# Patient Record
Sex: FEMALE | Race: OTHER
[De-identification: names, ages, dates, MRNs, and addresses within clinical notes are randomized per-mention and may not be internally consistent; named-entity substitution may affect disease eponyms.]

---

## 2017-04-10 ENCOUNTER — HOSPITAL ENCOUNTER (EMERGENCY)
Dept: HOSPITAL 62 - ER | Age: 27
Discharge: HOME | End: 2017-04-10
Payer: MEDICAID

## 2017-04-10 VITALS — SYSTOLIC BLOOD PRESSURE: 138 MMHG | DIASTOLIC BLOOD PRESSURE: 70 MMHG

## 2017-04-10 DIAGNOSIS — F17.210: ICD-10-CM

## 2017-04-10 DIAGNOSIS — R10.31: ICD-10-CM

## 2017-04-10 DIAGNOSIS — M79.604: ICD-10-CM

## 2017-04-10 DIAGNOSIS — R10.2: ICD-10-CM

## 2017-04-10 DIAGNOSIS — M54.41: Primary | ICD-10-CM

## 2017-04-10 DIAGNOSIS — R10.9: ICD-10-CM

## 2017-04-10 DIAGNOSIS — M54.5: ICD-10-CM

## 2017-04-10 LAB
APPEARANCE UR: (no result)
BILIRUB UR QL STRIP: NEGATIVE
GLUCOSE UR STRIP-MCNC: NEGATIVE MG/DL
KETONES UR STRIP-MCNC: NEGATIVE MG/DL
NITRITE UR QL STRIP: NEGATIVE
PH UR STRIP: 7 [PH] (ref 5–9)
PROT UR STRIP-MCNC: NEGATIVE MG/DL
SP GR UR STRIP: 1.01
UROBILINOGEN UR-MCNC: NEGATIVE MG/DL (ref ?–2)

## 2017-04-10 PROCEDURE — 81025 URINE PREGNANCY TEST: CPT

## 2017-04-10 PROCEDURE — 96372 THER/PROPH/DIAG INJ SC/IM: CPT

## 2017-04-10 PROCEDURE — 72110 X-RAY EXAM L-2 SPINE 4/>VWS: CPT

## 2017-04-10 PROCEDURE — 99284 EMERGENCY DEPT VISIT MOD MDM: CPT

## 2017-04-10 PROCEDURE — 81001 URINALYSIS AUTO W/SCOPE: CPT

## 2017-04-10 NOTE — ER DOCUMENT REPORT
ED GI/





- General


Chief Complaint: Low Back Pain


Stated Complaint: LOW BACK PAIN


Time seen by provider: 14:13


Mode of Arrival: Ambulatory


Information source: Patient


Notes: 


26-year-old female presents to ED for complaint of lower back pain radiating 

down her right leg with right lower quadrant abdominal pain today.  She states 

she was recently treated for a UTI with 150 mg of Diflucan with no relief from 

symptoms.  She was also told that she had a ruptured right ovarian cyst, and a 

left ovarian cyst.


TRAVEL OUTSIDE OF THE [place] IN LAST 30 DAYS: No





- HPI


Patient complains to provider of: Pelvic pain, Other - Low back


Onset: Last week


Timing/Duration: Intermittent


Quality of pain: Sharp


Severity at maximum: Severe


Severity in ED: Severe


Pain Level: 5


Location: RLQ, Low back, Pelvis


Vaginal bleeding (Compared to normal period): None


LMP: 2017


Associated symptoms: Radiates to back.  denies: Fever, Nausea, Urinary hesitancy

, Urinary frequency, Urinary retention, Urinary urgency, Vaginal discharge


Exacerbated by: Movement


Relieved by: Denies


Similar symptoms previously: Yes


Recently seen / treated by doctor: Yes





- Related Data


Allergies/Adverse Reactions: 


 





No Known Allergies Allergy (Verified 04/10/17 13:35)


 











Past Medical History





- General


Information source: Patient





- Social History


Smoking Status: Current Every Day Smoker


Cigarette use (# per day): Yes - 3 cigarettes a


Chew tobacco use (# tins/day): No


Smoking Education Provided: Yes - less than 2 minutes


Frequency of alcohol use: None


Drug Abuse: None


Occupation: none


Lives with: Spouse/Significant other - With child


Family History: Reviewed & Not Pertinent


Patient has suicidal ideation: No


Patient has homicidal ideation: No





- Past Medical History


Cardiac Medical History: Reports: None


Pulmonary Medical History: Reports: None


EENT Medical History: Reports: None


Neurological Medical History: Reports: None


Endocrine Medical History: Reports: None


Renal/ Medical History: Reports: Hx Ovarian Cysts


Malignancy Medical History: Reports: None


GI Medical History: Reports: None


Musculoskeltal Medical History: Reports None


Skin Medical History: Reports None


Psychiatric Medical History: Reports: None


Traumatic Medical History: Reports: None


Infectious Medical History: Reports: None


Past Surgical History: Reports: Hx  Section





- Immunizations


Immunizations up to date: Yes





Review of Systems





- Review of Systems


Constitutional: No symptoms reported


EENT: No symptoms reported


Cardiovascular: No symptoms reported


Respiratory: No symptoms reported


Gastrointestinal: Abdominal pain - Right flank lower quadrant and pelvic pain 

radiate around to the back


Genitourinary: No symptoms reported


Female Genitourinary: No symptoms reported


Musculoskeletal: Back pain - Low back pain bilateral radiating down right leg, 

Muscle pain


Skin: No symptoms reported


Hematologic/Lymphatic: No symptoms reported


Neurological/Psychological: No symptoms reported


-: Yes All other systems reviewed and negative





Physical Exam





- Vital signs


Vitals: 


 











Temp Pulse Resp BP Pulse Ox


 


 98.3 F   93   16   146/75 H  99 


 


 04/10/17 13:39  04/10/17 13:39  04/10/17 13:39  04/10/17 13:39  04/10/17 13:39











Interpretation: Normal





- General


General appearance: Appears well, Alert





- HEENT


Head: Normocephalic, Atraumatic


Eyes: Normal


Pupils: PERRL





- Respiratory


Respiratory status: No respiratory distress


Chest status: Nontender


Breath sounds: Normal


Chest palpation: Normal





- Cardiovascular


Rhythm: Regular


Heart sounds: Normal auscultation


Murmur: No





- Abdominal


Inspection: Normal


Distension: No distension


Bowel sounds: Normal


Tenderness: Tender - Mild tenderness to right lower quadrant more pain to the 

right pelvic


Organomegaly: No organomegaly





- Back


Back: Normal, Tender.  No: Deformity/step-off, CVA tenderness, Vertebra 

tenderness, Scars, Scoliosis, Wounds, Other





- Extremities


General upper extremity: Normal inspection, Nontender, Normal color, Normal ROM

, Normal temperature


General lower extremity: Normal inspection, Nontender, Normal color, Normal ROM

, Normal temperature, Normal weight bearing.  No: Ashanti's sign





- Neurological


Neuro grossly intact: Yes


Cognition: Normal


Orientation: AAOx4


[place] Coma Scale Eye Opening: Spontaneous


[place] Coma Scale Verbal: Oriented


[place] Coma Scale Motor: Obeys Commands


[place] Coma Scale Total: 15


Speech: Normal


Motor strength normal: LUE, RUE, LLE, RLE


Sensory: Normal





- Psychological


Associated symptoms: Normal affect, Normal mood





- Skin


Skin Temperature: Warm


Skin Moisture: Dry


Skin Color: Normal





Course





- Re-evaluation


Re-evalutation: 





04/10/17 16:24


26-year-old female presented to ED for low back pain radiating down the right 

leg across the buttocks.  The x-rays are negative she also complained of flank 

pain in her urine is negative.  Have discussed use of ibuprofen back exercises 

any hot and cold packs.  We will discharge home with a dispense pack of no code 

and have her follow-up with her primary doctor for any continued pain or 

increase in pain.





- Vital Signs


Vital signs: 


 











Temp Pulse Resp BP Pulse Ox


 


 98.3 F   87   18   138/70 H  100 


 


 04/10/17 16:55  04/10/17 16:55  04/10/17 16:55  04/10/17 16:55  04/10/17 16:55














- Diagnostic Test


Radiology reviewed: Image reviewed, Reports reviewed





Discharge





- Discharge


Clinical Impression: 


 Flank pain





Back pain


Qualifiers:


 Back pain location: low back pain Chronicity: unspecified Back pain laterality

: bilateral Sciatica presence: with sciatica Sciatica laterality: sciatica of 

right side Qualified Code(s): M54.41 - Lumbago with sciatica, right side





Condition: Stable


Disposition: HOME, SELF-CARE


Instructions:  Stretching Exercises for the Back ([facility]), Family Physicians / 

Practices


Additional Instructions: 


LOW BACK PAIN:





     Three out of every four people will have an episode of disabling back pain 

during their lifetime.  Most commonly the pain is due to straining of the 

muscles and ligaments in the low back.


     Usual treatment includes: 


(1) Rest on a firm surface.  Avoid lying on your stomach.  


(2) Ice pack the painful area.  After a few days, gentle heat may be used 

intermittently to relax the area, or ice packs can be continued.  


(3) Medication may be needed -- muscle relaxers and antiinflammatory medicines 

are commonly used.  


(4) As the back improves, exercises are prescribed to strengthen the back and 

abdominal muscles.


     Your doctor will advise you on the proper care for your back at each stage 

in your recovery.  You may be better in a few days -- or healing may take 

several weeks.


     If new symptoms of a "herniated disc" (radiation of pain, numbness, or 

tingling down the back of the leg or weakness in the leg) occur, you should be 

re-examined.  Further testing may be necessary.





Flank Pain





     We weren't able to prove an exact cause for your flank pain. Pain in the 

flank can be caused by a muscle strain or spasm. Sometimes a kidney stone 

causes pain, but can't be found on our tests. Infection in the kidney should be 

evident on a urine test. Early shingles can occasionally cause flank pain, 

without the rash that proves the diagnosis. On rare occasions, disease of the 

pancreas, aorta, spleen, or colon can create pain in the flank.


     At this time, there's no evidence of a dangerous condition, and it seems 

safe for you to be at home. If the pain goes away and does not come back, no 

further testing will be needed. If pain persists, or becomes more severe, we 

may need to repeat some tests or order additional new testing.


     Blood in the urine, urgency to urinate frequently, and pain that radiates 

to the groin can indicate a kidney stone. Fever may mean that the pain is due 

to infection, either of the kidney or the colon (diverticulitis). If your pain 

is early shingles, you should develop an eruption of blisters in the painful 

area within a few days. 


     Call the doctor or return if you have pain that is spreading or becoming 

more severe, pain that does not resolve with time, fever, or any other new 

symptoms.








ORAL NARCOTIC MEDICATION:


     You have been given a dispense pack for pain control.  This medication is 

a narcotic.  It's best taken with food, as nausea can result if taken on an 

empty stomach.


     Don't operate machinery or drive within six hours of taking this 

medication.  Do not combine this medicine with alcohol, or with any medication 

which can cause sedation (such as cold tablets or sleeping pills) unless you 

get permission from the physician.


     Narcotics tend to cause constipation.  If possible, drink plenty of fluids 

and eat a diet high in fiber and fruits.





     Please be aware that prescription narcotics also have the potential for 

abuse.  People become addicted to these medications because of the general 

sense of wellbeing that they induce.  This feeling along with a significant 

reduction in tension, anxiety, and aggression provides a stimulating seductive 

quality to these drugs.  Once your pain is under control, we encourage you to 

discard your unused narcotics.





Ibuprofen





     Ibuprofen is an excellent, safe drug for pain control.  In addition, it 

has potent antiinflammatory effects which are beneficial, especially in the 

treatment of injuries, arthritis, or tendonitis. It's best to take ibuprofen 

with food.  Persons with ulcer disease or allergy to aspirin should notify 

their physician of this before taking ibuprofen.


     Take the medication exactly as prescribed.  Don't take additional doses 

unless instructed to do so by your doctor.  If you develop wheezing, shortness 

of breath, hives, faintness, stomach pain, vomiting, or dark black stools, 

return for re-evaluation at once.





ICE PACKS:


     Apply ice packs frequently against the painful area.  Many different 

schedules are recommended, such as "20 minutes on, 20 minutes off" or "one hour 

ice, two hours rest."  If you need to work, you may need to go longer between 

ice treatments.  You should plan to have the area ice packed AT LEAST one 

fourth of the time.


     The ice should be applied over the wrap, tape, or splint, or over a layer 

of cloth -- not directly against the skin.  Some ice bags have a built-in cloth 

and can be put directly on the skin.





WARM PACKS:


     After approximately two days, apply gentle heat (such as a heating pad or 

hot water bottle) for about 20 to 30 minutes about every two hours -- at least 

four times daily.  Warmth and elevation will help you make a more rapid recovery

, and will ease the pain considerably.


     Do not use HOT heat, and never apply heat for longer than 30 minutes.  The 

continuous heat can invisibly damage skin and muscles -- even when no burn is 

seen on the surface.  Damaged muscles can make you MORE sore.








FOLLOW-UP CARE:


If you have been referred to a physician for follow-up care, call the physician

s office for an appointment as you were instructed or within the next two days.

  If you experience worsening or a significant change in your symptoms, notify 

the physician immediately or return to the Emergency Department at any time for 

re-evaluation.


Forms:  Elevated Blood Pressure, Smoking Cessation Education

## 2018-01-10 ENCOUNTER — HOSPITAL ENCOUNTER (EMERGENCY)
Dept: HOSPITAL 62 - ER | Age: 28
Discharge: HOME | End: 2018-01-10
Payer: MEDICAID

## 2018-01-10 VITALS — SYSTOLIC BLOOD PRESSURE: 126 MMHG | DIASTOLIC BLOOD PRESSURE: 74 MMHG

## 2018-01-10 DIAGNOSIS — R05: ICD-10-CM

## 2018-01-10 DIAGNOSIS — R50.9: ICD-10-CM

## 2018-01-10 DIAGNOSIS — M79.1: Primary | ICD-10-CM

## 2018-01-10 DIAGNOSIS — F17.200: ICD-10-CM

## 2018-01-10 PROCEDURE — 99283 EMERGENCY DEPT VISIT LOW MDM: CPT

## 2018-01-12 ENCOUNTER — HOSPITAL ENCOUNTER (EMERGENCY)
Dept: HOSPITAL 62 - ER | Age: 28
Discharge: HOME | End: 2018-01-12
Payer: MEDICAID

## 2018-01-12 VITALS — DIASTOLIC BLOOD PRESSURE: 71 MMHG | SYSTOLIC BLOOD PRESSURE: 113 MMHG

## 2018-01-12 DIAGNOSIS — R09.89: ICD-10-CM

## 2018-01-12 DIAGNOSIS — R09.81: Primary | ICD-10-CM

## 2018-01-12 DIAGNOSIS — F17.210: ICD-10-CM

## 2018-01-12 LAB
A TYPE INFLUENZA AG: NEGATIVE
B INFLUENZA AG: NEGATIVE

## 2018-01-12 PROCEDURE — 87804 INFLUENZA ASSAY W/OPTIC: CPT

## 2018-01-12 PROCEDURE — 99283 EMERGENCY DEPT VISIT LOW MDM: CPT

## 2018-01-12 PROCEDURE — 71046 X-RAY EXAM CHEST 2 VIEWS: CPT

## 2018-01-12 NOTE — ER DOCUMENT REPORT
ED Flu Like





- General


Chief Complaint: Congestion


Stated Complaint: CONGESTION


Time Seen by Provider: 18 09:18


Mode of Arrival: Ambulatory


Information source: Patient


Notes: 





27-year-old female presented to ED for cough and cold flu symptoms for a week.  

She states 2 days ago she was told she had the flu but she was not tested.  She 

states she has had a lot of congestion and nasal drainage.  She states she felt 

like she had the temperature but has not taken her temperature.


TRAVEL OUTSIDE OF THE [place] IN LAST 30 DAYS: No





- HPI


Onset: Last week


Timing/Duration: Intermittent


Quality of pain: Achy


Severity: Moderate


Pain Level: 3


CO exposure: No


Associated symptoms: Body/muscle aches, Chills, Nonproductive cough, Fever, 

Rhinnorhea, Sinus pain/drainage, Sore throat


Similar symptoms previously: Yes


Recently seen / treated by doctor: Yes





- Related Data


Allergies/Adverse Reactions: 


 





No Known Allergies Allergy (Verified 18 08:45)


 











Past Medical History





- General


Information source: Patient





- Social History


Smoking Status: Current Every Day Smoker


Cigarette use (# per day): Yes - 2 cigarettes a day


Chew tobacco use (# tins/day): No


Smoking Education Provided: Yes - 4 minutes


Frequency of alcohol use: None


Drug Abuse: None


Occupation: [employer]


Lives with: Family


Family History: Arthritis, CAD, DM, Hyperlipidemia, Hypertension.  denies: COPD

, CVA, Malignancy, Thyroid Disfunction


Patient has suicidal ideation: No


Patient has homicidal ideation: No





- Past Medical History


Cardiac Medical History: Reports: None


Pulmonary Medical History: Reports: None


EENT Medical History: Reports: None


Neurological Medical History: Reports: None


Endocrine Medical History: Reports: None


Renal/ Medical History: Reports: Hx Ovarian Cysts


Malignancy Medical History: Reports: None


GI Medical History: Reports: None


Musculoskeltal Medical History: Reports None


Skin Medical History: Reports None


Psychiatric Medical History: Reports: None


Traumatic Medical History: Reports: None


Infectious Medical History: Reports: None


Past Surgical History: Reports: Hx  Section





- Immunizations


Immunizations up to date: Yes


Hx Diphtheria, Pertussis, Tetanus Vaccination: No





Review of Systems





- Review of Systems


Constitutional: Fever, Recent illness


EENT: Nose congestion, Nose discharge, Sinus pressure, Sinus discharge, Throat 

pain


Cardiovascular: No symptoms reported


Respiratory: Cough


Gastrointestinal: No symptoms reported


Genitourinary: No symptoms reported


Female Genitourinary: No symptoms reported


Musculoskeletal: No symptoms reported


Skin: No symptoms reported


Hematologic/Lymphatic: No symptoms reported


Neurological/Psychological: No symptoms reported


-: Yes All other systems reviewed and negative





Physical Exam





- Vital signs


Vitals: 


 











Temp Pulse Resp BP Pulse Ox


 


 98.4 F   97   16   119/76   98 


 


 18 08:56  18 08:56  18 08:56  18 08:56  18 08:56











Interpretation: Normal





- General


General appearance: Appears well, Alert





- HEENT


Head: Normocephalic, Atraumatic


Eyes: Normal


Pupils: PERRL


Ears: Normal


External canal: Normal


Tympanic membrane: Normal


Sinus: Normal


Nasal: Purulent discharge, Swelling


Mouth/Lips: Normal


Mucous membranes: Normal


Pharynx: Post nasal drainage


Neck: Normal





- Respiratory


Respiratory status: No respiratory distress


Chest status: Nontender


Breath sounds: Nonproductive cough


Chest palpation: Normal





- Cardiovascular


Rhythm: Regular


Heart sounds: Normal auscultation


Murmur: No





- Abdominal


Inspection: Normal


Distension: No distension


Bowel sounds: Normal


Tenderness: Nontender


Organomegaly: No organomegaly





- Back


Back: Normal, Nontender





- Extremities


General upper extremity: Normal inspection, Nontender, Normal color, Normal ROM

, Normal temperature


General lower extremity: Normal inspection, Nontender, Normal color, Normal ROM

, Normal temperature, Normal weight bearing.  No: Ashanti's sign





- Neurological


Neuro grossly intact: Yes


Cognition: Normal


Orientation: AAOx4


[place] Coma Scale Eye Opening: Spontaneous


[place] Coma Scale Verbal: Oriented


[place] Coma Scale Motor: Obeys Commands


[place] Coma Scale Total: 15


Speech: Normal


Motor strength normal: LUE, RUE, LLE, RLE


Sensory: Normal





- Psychological


Associated symptoms: Normal affect, Normal mood





- Skin


Skin Temperature: Warm


Skin Moisture: Dry


Skin Color: Normal





Course





- Re-evaluation


Re-evalutation: 





18 11:11


Assessment consistent with an upper respiratory infection.  Chest x-ray and flu 

swabs are negative.  Patient was discharged home with instructions for an upper 

respiratory infection.





- Vital Signs


Vital signs: 


 











Temp Pulse Resp BP Pulse Ox


 


 98.6 F   66   16   113/71   98 


 


 18 11:26  18 11:26  18 11:26  18 11:26  18 11:26














Discharge





- Discharge


Clinical Impression: 


 Flu-like symptoms





Condition: Stable


Disposition: HOME, SELF-CARE


Instructions:  Family Physicians / Practices


Additional Instructions: 


UPPER RESPIRATORY ILLNESS:





     You have a viral infection of the respiratory passages -- a "cold."  This 

common infection causes nasal congestion, drainage, and often sore throat and 

cough.  It is highly contagious.  The disease usually lasts about 10 to 14 days.


     There is no "cure" for the viral infection -- it must run its course.  If 

there is a complication, such as bacterial infection in the nose, sinuses, 

middle ear, or bronchial tubes, antibiotics may be required.  The antibiotics 

won't affect the virus.


     Drink plenty of fluids.  A humidifier may help.  An expectorant medication 

or decongestant may make you more comfortable.  Use acetaminophen or ibuprofen 

for fever or aches.


     See the doctor if fever persists over two days, if there is any 

significant worsening of your symptoms, or if you simply fail to improve as 

expected.








DECONGESTANT MEDICATION:


     A decongestant medicine has been prescribed.  Often this medicine is 

combined in the same tablet with an antihistamine or expectorant. This type of 

medicine is helpful in treating a bad cold or sinus condition, as well as in 

treatment of the nasal congestion of hay fever.  It is not of much benefit for 

lung infections.


     Decongestant medicines are related to stimulants.  They can cause an 

increase in blood pressure and heart rate.  Persons with heart disease and high 

blood pressure should not take decongestants without discussing this with the 

physician.


     If you develop palpitations, chest pain, headache, or tremors, stop the 

medicine and consult your physician.





Viral Syndrome





     The physician has diagnosed a viral infection.  Viruses not only cause 

"colds," but can cause many different symptoms including generalized aching, 

fever, headache, cough, diarrhea, nausea, vomiting, and fatigue.


     The treatment, for the most part, is simply relief of symptoms. This means 

that antibiotics are usually not given.  Rest, fluids, pain medications and, 

occasionally, medication for the specific symptoms that are most bothersome 

will be prescribed. Use good handwashing to avoid passing the virus to others. 

Shared toys should be cleaned with disinfectant. Clean the toilets, sinks, and 

counter surfaces in bathrooms. Launder clothing in hot water.


     Contact the physician if you develop any new or unusual symptoms such as 

severe headache, stiff neck, high fever, chest pain, productive cough, or 

shortness of breath.  You should be rechecked if you don't see marked 

improvement within seven to 10 days.





COUGH-SUPPRESSANT & EXPECTORANT MEDICATION:


     You are to use a cough medication as needed for relief of symptoms.  This 

medicine is a combination of an expectorant (to make the mucous thinner and 

more easily "coughed up") and a cough suppressant (to reduce the frequency of 

coughing).


     The cough-suppressant medicine is related to narcotics.  You may 

experience mild nausea and sleepiness.  Some patients who are very sensitive to 

narcotics may have stomach pain from this medicine. Taking the medicine with 

food reduces these side effects.  Do not drive or work with machinery until you 

know how this medicine affects you.


     The expectorant should have no side effects.  Iodine-containing 

expectorants (such as organidin) should not be taken by persons with active 

thyroid disease unless approved by your doctor.


     Call the doctor if you develop shortness of breath, hives, rash, itching, 

lightheadedness, or severe nausea and vomiting.








USE OF ACETAMINOPHEN (Tylenol):


     Acetaminophen may be taken for pain relief or fever control. It's much 

safer than aspirin, offering a wider range of "safe" dosages.  It is safe 

during pregnancy.  Some brand names are Tylenol, Panadol, Datril, Anacin 3, 

Tempra, and Liquiprin. Acetaminophen can be repeated every four hours.  The 

following are maximum recommended dosages:


>89 pounds or adults          650 mg to 900 mg


Acetaminophen can be repeated every four hours.  Maximum dose not to exceed 

4000 mg a day.








SMOKING:


     If you smoke, you should stop smoking.  The tar and chemicals in cigarette 

smoke are harmful.  Smoking has been shown to cause:


          emphysema


          chronic bronchitis


          lung cancer


          mouth and throat cancer


          stomach and pancreas cancer


          premature aging


          birth defects


     In addition, smoking increases ear and lung infections in children of 

smokers.








FOLLOW-UP CARE:


If you have been referred to a physician for follow-up care, call the physician

s office for an appointment as you were instructed or within the next two days.

  If you experience worsening or a significant change in your symptoms, notify 

the physician immediately or return to the Emergency Department at any time for 

re-evaluation.





Prescriptions: 


Ondansetron [Zofran Odt 4 mg Tablet] 1 tab PO Q6H #15 tab.rapdis


Forms:  Smoking Cessation Education, Return to Work

## 2018-01-12 NOTE — ER DOCUMENT REPORT
ED Pediatric Illness





- General


Chief Complaint: Congestion


Stated Complaint: CONGESTION


Time Seen by Provider: 18 09:18


Mode of Arrival: Ambulatory


Information source: Patient


TRAVEL OUTSIDE OF THE [place] IN LAST 30 DAYS: No





- Related Data


Allergies/Adverse Reactions: 


 





No Known Allergies Allergy (Verified 18 08:45)


 











Past Medical History





- Social History


Smoking Status: Never Smoker


Chew tobacco use (# tins/day): No


Frequency of alcohol use: None


Drug Abuse: None


Family History: Reviewed & Not Pertinent


Patient has suicidal ideation: No


Patient has homicidal ideation: No


Renal/ Medical History: Reports: Hx Ovarian Cysts.  Denies: Hx Peritoneal 

Dialysis


Past Surgical History: Reports: Hx  Section





- Immunizations


Immunizations up to date: Yes


Hx Diphtheria, Pertussis, Tetanus Vaccination: No





Physical Exam





- Vital signs


Vitals: 





 











Temp Pulse Resp BP Pulse Ox


 


 98.4 F   97   16   119/76   98 


 


 18 08:56  18 08:56  18 08:56  18 08:56  18 08:56














Course





- Vital Signs


Vital signs: 





 











Temp Pulse Resp BP Pulse Ox


 


 98.4 F   97   16   119/76   98 


 


 18 08:56  18 08:56  18 08:56  18 08:56  18 08:56

## 2018-01-12 NOTE — RADIOLOGY REPORT (SQ)
EXAM DESCRIPTION:  CHEST PA/LAT



COMPLETED DATE/TIME:  1/12/2018 9:58 am



REASON FOR STUDY:  cough fever



COMPARISON:  None.



EXAM PARAMETERS:  NUMBER OF VIEWS: two views

TECHNIQUE: Digital Frontal and Lateral radiographic views of the chest acquired.

RADIATION DOSE: NA

LIMITATIONS: none



FINDINGS:  LUNGS AND PLEURA: No opacities, masses or pneumothorax. No pleural effusion.

MEDIASTINUM AND HILAR STRUCTURES: No masses or contour abnormalities.

HEART AND VASCULAR STRUCTURES: Heart normal size.  No evidence for failure.

BONES: No acute findings.

HARDWARE: None in the chest.

OTHER: No other significant finding.



IMPRESSION:  NO SIGNIFICANT RADIOGRAPHIC FINDING IN THE CHEST.



TECHNICAL DOCUMENTATION:  JOB ID:  [id number]

 2011 [employer]- All Rights Reserved

## 2018-01-22 ENCOUNTER — HOSPITAL ENCOUNTER (EMERGENCY)
Dept: HOSPITAL 62 - ER | Age: 28
LOS: 1 days | Discharge: HOME | End: 2018-01-23
Payer: MEDICAID

## 2018-01-22 DIAGNOSIS — R11.0: ICD-10-CM

## 2018-01-22 DIAGNOSIS — T18.4XXA: ICD-10-CM

## 2018-01-22 DIAGNOSIS — M54.5: ICD-10-CM
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
## 2018-01-22 DIAGNOSIS — N93.9: ICD-10-CM

## 2018-01-22 DIAGNOSIS — R10.31: ICD-10-CM

## 2018-01-22 DIAGNOSIS — R03.0: ICD-10-CM

## 2018-01-22 DIAGNOSIS — N83.201: Primary | ICD-10-CM

## 2018-01-22 LAB
ADD MANUAL DIFF: NO
ALBUMIN SERPL-MCNC: 4.6 G/DL (ref 3.5–5)
ALP SERPL-CCNC: 57 U/L (ref 38–126)
ALT SERPL-CCNC: 30 U/L (ref 9–52)
ANION GAP SERPL CALC-SCNC: 10 MMOL/L (ref 5–19)
APPEARANCE UR: (no result)
APTT PPP: YELLOW S
AST SERPL-CCNC: 25 U/L (ref 14–36)
BASOPHILS # BLD AUTO: 0.1 10^3/UL (ref 0–0.2)
BASOPHILS NFR BLD AUTO: 0.8 % (ref 0–2)
BILIRUB DIRECT SERPL-MCNC: 0.1 MG/DL (ref 0–0.4)
BILIRUB SERPL-MCNC: 0.5 MG/DL (ref 0.2–1.3)
BILIRUB UR QL STRIP: NEGATIVE
BUN SERPL-MCNC: 12 MG/DL (ref 7–20)
CALCIUM: 10.2 MG/DL (ref 8.4–10.2)
CHLORIDE SERPL-SCNC: 106 MMOL/L (ref 98–107)
CO2 SERPL-SCNC: 23 MMOL/L (ref 22–30)
EOSINOPHIL # BLD AUTO: 0.1 10^3/UL (ref 0–0.6)
EOSINOPHIL NFR BLD AUTO: 1 % (ref 0–6)
ERYTHROCYTE [DISTWIDTH] IN BLOOD BY AUTOMATED COUNT: 14.1 % (ref 11.5–14)
GLUCOSE SERPL-MCNC: 77 MG/DL (ref 75–110)
GLUCOSE UR STRIP-MCNC: NEGATIVE MG/DL
HCT VFR BLD CALC: 42 % (ref 36–47)
HGB BLD-MCNC: 14.4 G/DL (ref 12–15.5)
KETONES UR STRIP-MCNC: NEGATIVE MG/DL
LYMPHOCYTES # BLD AUTO: 1.8 10^3/UL (ref 0.5–4.7)
LYMPHOCYTES NFR BLD AUTO: 22.1 % (ref 13–45)
MCH RBC QN AUTO: 30.1 PG (ref 27–33.4)
MCHC RBC AUTO-ENTMCNC: 34.2 G/DL (ref 32–36)
MCV RBC AUTO: 88 FL (ref 80–97)
MONOCYTES # BLD AUTO: 0.5 10^3/UL (ref 0.1–1.4)
MONOCYTES NFR BLD AUTO: 5.5 % (ref 3–13)
NEUTROPHILS # BLD AUTO: 5.9 10^3/UL (ref 1.7–8.2)
NEUTS SEG NFR BLD AUTO: 70.6 % (ref 42–78)
NITRITE UR QL STRIP: NEGATIVE
PH UR STRIP: 5 [PH] (ref 5–9)
PLATELET # BLD: 560 10^3/UL (ref 150–450)
POTASSIUM SERPL-SCNC: 4.6 MMOL/L (ref 3.6–5)
PROT SERPL-MCNC: 7.3 G/DL (ref 6.3–8.2)
PROT UR STRIP-MCNC: NEGATIVE MG/DL
RBC # BLD AUTO: 4.78 10^6/UL (ref 3.72–5.28)
SODIUM SERPL-SCNC: 139.4 MMOL/L (ref 137–145)
SP GR UR STRIP: 1.02
TOTAL CELLS COUNTED % (AUTO): 100 %
UROBILINOGEN UR-MCNC: NEGATIVE MG/DL (ref ?–2)
WBC # BLD AUTO: 8.3 10^3/UL (ref 4–10.5)

## 2018-01-22 PROCEDURE — 81001 URINALYSIS AUTO W/SCOPE: CPT

## 2018-01-22 PROCEDURE — 80053 COMPREHEN METABOLIC PANEL: CPT

## 2018-01-22 PROCEDURE — 36415 COLL VENOUS BLD VENIPUNCTURE: CPT

## 2018-01-22 PROCEDURE — 84702 CHORIONIC GONADOTROPIN TEST: CPT

## 2018-01-22 PROCEDURE — 87591 N.GONORRHOEAE DNA AMP PROB: CPT

## 2018-01-22 PROCEDURE — 87086 URINE CULTURE/COLONY COUNT: CPT

## 2018-01-22 PROCEDURE — 74177 CT ABD & PELVIS W/CONTRAST: CPT

## 2018-01-22 PROCEDURE — 99284 EMERGENCY DEPT VISIT MOD MDM: CPT

## 2018-01-22 PROCEDURE — 87210 SMEAR WET MOUNT SALINE/INK: CPT

## 2018-01-22 PROCEDURE — 96375 TX/PRO/DX INJ NEW DRUG ADDON: CPT

## 2018-01-22 PROCEDURE — 85025 COMPLETE CBC W/AUTO DIFF WBC: CPT

## 2018-01-22 PROCEDURE — 87491 CHLMYD TRACH DNA AMP PROBE: CPT

## 2018-01-22 PROCEDURE — 96365 THER/PROPH/DIAG IV INF INIT: CPT

## 2018-01-22 NOTE — ER DOCUMENT REPORT
ED Medical Screen (RME)





- General


Chief Complaint: Abdominal Pain


Stated Complaint: BACK/ABDOMINAL PAIN


Time Seen by Provider: 18 18:10


TRAVEL OUTSIDE OF THE [place] IN LAST 30 DAYS: No





- HPI


Notes: 





18 18:11


Patient is a 27-year-old female with no significant past medical history 

presents ED complaining of right lower quadrant abdominal pain 5 days.  

Patient states that the pain does not radiate and has been relatively steady 

over the last 5 days with intermittent periods of increased pain.  Patient 

states that on occasion she will have some pain in the left lower quadrant, but 

is primarily to the right.  Patient has not noticed any worsening pain with 

walking or jumping.  Patient has had intermittent nausea without any vomiting.  

She still eating and drinking without any difficulties.  She is urinating 

normally and having normal bowel movements.  Patient states that she also does 

have associated right lower back pain that does not radiate around to her 

groin.   patient is not aware of any injury or recent trauma.  She denies any 

injections to her lower back or IV drug use.  Pt has also had white vaginal 

discharge x months.  Patient denies any drug allergies.  Denies any headache, 

fever, neck pain, URI, sore throat, chest pain, palpitations, syncope, cough, 

shortness of breath, wheeze, dyspnea, vomiting/diarrhea, urinary retention, 

dysuria, hematuria, loss of control of bowel or bladder, numbness/tingling, 

saddle anesthesia, muscle paralysis/weakness, or rash.








Pt was eval'd today by her PCM who sent her to the ED for evaluation.  Pt has 

remained NPO since breakfast this morning.








I have treated and performed a rapid initial assessment of this patient.  A 

comprehensive ED assessment and evaluation of the patient, analysis of test 

results and completion of medical decision making process will be conducted by 

additional ED providers.


18 18:16








- Related Data


Allergies/Adverse Reactions: 


 





No Known Allergies Allergy (Verified 18 17:22)


 











Past Medical History


Renal/ Medical History: Reports: Hx Ovarian Cysts.  Denies: Hx Peritoneal 

Dialysis


Past Surgical History: Reports: Hx  Section





- Immunizations


Immunizations up to date: Yes


Hx Diphtheria, Pertussis, Tetanus Vaccination: No





Physical Exam





- Vital signs


Vitals: 





 











Temp Pulse Resp BP Pulse Ox


 


 98.7 F   90   18   124/71   100 


 


 18 17:26  18 17:26  18 17:26  18 17:26  18 17:26














- Respiratory


Respiratory status: No respiratory distress


Breath sounds: Normal





- Cardiovascular


Rhythm: Regular


Heart sounds: Normal auscultation





- Abdominal


Inspection: Normal


Distension: No distension


Bowel sounds: Normal


Tenderness: Tender - RLQ





Course





- Vital Signs


Vital signs: 





 











Temp Pulse Resp BP Pulse Ox


 


 98.7 F   90   18   124/71   100 


 


 18 17:26  18 17:26  18 17:26  18 17:26  18 17:26

## 2018-01-23 VITALS — DIASTOLIC BLOOD PRESSURE: 75 MMHG | SYSTOLIC BLOOD PRESSURE: 117 MMHG

## 2018-01-23 LAB
BACTERIA (WET MOUNT): (no result)
CHLAM PCR: NOT DETECTED
EPITHELIALS (WET MOUNT): (no result)
GON PCR: NOT DETECTED
RBCS (WET MOUNT): (no result)
T.VAGINALIS (WET MOUNT): (no result)
WBCS (WET MOUNT): (no result)
YEAST (WET MOUNT): (no result)

## 2018-01-23 NOTE — RADIOLOGY REPORT (SQ)
EXAM DESCRIPTION:  CT ABDOMEN AND PELVIS WITH CONTRAST



CLINICAL HISTORY: right lower quad pain



COMPARISON: None Available.



TECHNIQUE: CT of the abdomen and pelvis are performed during IV

bolus administration of 77.30 mL of Isovue-370. Delayed images

obtained.



DLP: 785.16 mGycm



FINDINGS: 



Abdomen:

The liver has normal size and density. No intrahepatic mass or

biliary dilatation. No calcified gallstones.  The spleen,

pancreas, and adrenal glands are unremarkable.  The kidneys have

normal size and contour without evidence of solid mass or

hydronephrosis.  The aorta and IVC have normal caliber and

position.  The portal vein patent. The proximal visceral and

renal arteries are patent.  No free intraperitoneal air.  The

stomach and duodenum have normal course.



Pelvis: IUD in the uterus with appropriate orientation. Urinary

bladder is unremarkable.  Tiny amount of free pelvic fluid is

likely physiologic.  No dilated loops of large or small bowel. 

Possible metallic foreign body in the distal tip of the appendix

measuring approximately 0.9 x 0.2 cm. No dilation of the appendix

or periappendiceal inflammatory change.



The visualized  lung bases are clear.



No destructive bone lesions identified.





IMPRESSION: 



1. No CT evidence of acute appendicitis.



2. Possible metallic foreign body in the distal portion of the

appendix measuring 0.9 x 0.2 cm. Correlation for history of

ingested metallic foreign body recommended.



3. Tiny amount of free pelvic fluid is likely physiologic.



This exam was performed according to our departmental

dose-optimization program, which includes automated exposure

control, adjustment of the mA and/or kV according to patient size

and/or use of iterative reconstruction technique.

## 2018-01-23 NOTE — ER DOCUMENT REPORT
ED GI/





- General


Chief Complaint: Abdominal Pain


Stated Complaint: BACK/ABDOMINAL PAIN


Time Seen by Provider: 18 18:10


Mode of Arrival: Ambulatory


Information source: Patient


TRAVEL OUTSIDE OF THE [place] IN LAST 30 DAYS: No





- HPI


Patient complains to provider of: Abdominal pain, Vaginal discharge


Onset: Last week


Notes: 





18 00:08


Patient arrives with complaints of right-sided abdominal pain and low back pain 

that started approximately 5 days ago.  Pain has been constant.  It seems to be 

getting somewhat worse.  Nothing in particular seems to make the pain better or 

worse.  The patient's had nausea but denies any vomiting or diarrhea.  She 

denies any dysuria or hematuria.  She is complaining of some vaginal discharge.

  She states her last normal menstrual period was December 15.  She denies any 

flank pain.  She denies any chest pain or shortness of breath.  No fevers.  No 

rash.  No headache, blurred vision, numbness tingling or weakness.  She had a 

prior  in the past, she denies any other abdominal surgeries.  She 

denies any headache, blurred vision, numbness, tingling, weakness.  She denies 

any other complaints at this time.





- Related Data


Allergies/Adverse Reactions: 


 





No Known Allergies Allergy (Verified 18 17:22)


 











Past Medical History





- Social History


Smoking Status: Unknown if Ever Smoked


Family History: Arthritis, CAD, DM, Hyperlipidemia, Hypertension.  denies: COPD

, CVA, Malignancy, Thyroid Disfunction


Renal/ Medical History: Reports: Hx Ovarian Cysts.  Denies: Hx Peritoneal 

Dialysis


Past Surgical History: Reports: Hx  Section





- Immunizations


Immunizations up to date: Yes


Hx Diphtheria, Pertussis, Tetanus Vaccination: No





Review of Systems





- Review of Systems


-: Yes All other systems reviewed and negative





Physical Exam





- Vital signs


Vitals: 


 











Temp Pulse Resp BP Pulse Ox


 


 98.7 F   90   18   124/71   100 


 


 18 17:26  18 17:26  18 17:26  18 17:26  18 17:26














- Notes


Notes: 





GENERAL: alert, cooperative, nontoxic, no distress.


HEAD: normocephalic, atraumatic


EYES: conjunctiva pink without discharge, no external redness or swelling.


EARS: no external swelling, no external redness


NOSE: atraumatic, no external swelling


MOUTH/THROAT: mucous membranes moist and pink, posterior pharynx without 

erythema, swelling, exudate. No trismus or drooling.


NECK: soft, supple, full range of motion, no meningismus.


CHEST: no distress, lungs clear and equal throughout.  No wheezing, rales, 

rhonchi.


CARDIAC: regular rate and rhythm, no murmur, normal capillary refill, normal 

pulses.  No peripheral edema noted.


ABDOMEN: Soft, mild tenderness palpation of the lower abdomen.  No rebound 

tenderness or guarding.


BACK: full range of motion, no CVA tenderness.


EXTREMITIES: full range of motion of all extremities.  No redness, no swelling.


NEURO: alert and oriented x 3, no focal deficits, full range of motion of all 

extremities.


PYSCH: appropriate mood, affect.  Patient is cooperative.


SKIN: pink, warm, dry, no rash.


: Performed with female nurse at the bedside.  No external lesions noted.  

Cervix is closed.  Small amount of white vaginal discharge is present.  No 

cervical motion tenderness.  Minimal right and left adnexal tenderness on 

bimanual exam with no mass.





Course





- Re-evaluation


Re-evalutation: 





18 01:56


Patient is nontoxic appearing with stable vitals.  The patient arrives with 5 

days of right-sided abdominal pain radiating into her back.  She is a benign 

abdominal exam with minimal tenderness to palpation no guarding or rebound 

tenderness.  She has had no fever.  She is afebrile here.  Her white blood cell 

count is normal.  Pelvic exam shows some mild adnexal tenderness bilaterally 

with no purulent drainage from the cervix and no cervical motion tenderness.  

Patient was treated for gonorrhea and chlamydia here in the emergency 

department.  Those cultures are still pending.  Wet prep shows possible actual 

vaginosis which I will treat her with Flagyl for.  CT with IV contrast of the 

abdomen and pelvis shows metallic foreign body within the appendix.  There is 

no dilation of the appendix or inflammation around the appendix concerning for 

acute appendicitis.  I discussed this with the surgeon on call Dr. Ambrosio, he 

was given all the information believes that it is fine for this patient to 

follow-up as an outpatient in the office.  Patient has some free fluid within 

the pelvis likely secondary to a recently ruptured ovarian cyst.  The patient 

has a history of this in the past and states that this pain does feel similar 

to that as well.  Patient was given all this information.  She will be 

discharged home with a prescription for Ultram for pain.  Instructions to follow

-up with surgeon as an outpatient.  Follow-up sooner for increasing pain, fever

, persistent vomiting, or for any further concerns.





The patient's emergency department workup and current diagnosis were explained 

to the patient and or family.  Follow-up instructions were provided.  

Medications if prescribed were discussed. Instructions for when to return to 

the emergency department including specific  worrisome symptoms were discussed 

with the patient and/or family.





The patient is noted to have elevated blood pressure during today's emergency 

department visit.  The patient was informed of this finding.  The patient was 

instructed that this may be related to pre-hypertension and requires further 

evaluation with a primary care provider.  The patient has no hypertensive 

symptoms at this time.





- Vital Signs


Vital signs: 


 











Temp Pulse Resp BP Pulse Ox


 


 98.7 F   90   18   124/71   100 


 


 18 17:26  18 17:26  18 17:26  18 17:26  18 17:26














- Laboratory


Result Diagrams: 


 18 19:35





 18 19:35


Laboratory results interpreted by me: 


 











  18





  19:05 19:35


 


RDW   14.1 H


 


Plt Count   560 H


 


Urine Blood  SMALL H 


 


Ur Leukocyte Esterase  TRACE H 














- Diagnostic Test


Radiology reviewed: Image reviewed, Reports reviewed - Metallic foreign body 

within the appendix with no other signs of acute appendicitis.  Free fluid 

within the pelvis consistent with possible recent rupture of ovarian cyst.





Discharge





- Discharge


Clinical Impression: 


 Ruptured ovarian cyst





Abdominal pain


Qualifiers:


 Abdominal location: right lower quadrant Qualified Code(s): R10.31 - Right 

lower quadrant pain





Condition: Stable


Disposition: HOME, SELF-CARE


Instructions:  Abdominal Pain ([facility])


Additional Instructions: 


Take medications as prescribed.  Follow-up with surgery at the next available 

appointment.  Return the emergency department for increasing pain, high fever, 

persistent vomiting, or any further concerns.





Your blood pressure was elevated during today's visit.  Have this rechecked 

with your doctor.


Prescriptions: 


Tramadol HCl [Ultram 50 mg Tablet] 50 mg PO Q6HP PRN #12 tablet


 PRN Reason: 


Diclofenac Sodium [Voltaren 50 Mg Tablet.] 50 mg PO BID #20 tablet.


Forms:  Elevated Blood Pressure, Smoking Cessation Education


Referrals: 


DONN AMBROSIO MD [ACTIVE STAFF] - Follow up as needed

## 2018-02-13 ENCOUNTER — HOSPITAL ENCOUNTER (OUTPATIENT)
Dept: HOSPITAL 62 - ER | Age: 28
Setting detail: OBSERVATION
LOS: 1 days | Discharge: HOME | End: 2018-02-14
Attending: SURGERY
Payer: MEDICAID

## 2018-02-13 DIAGNOSIS — T18.4XXA: ICD-10-CM

## 2018-02-13 DIAGNOSIS — F17.200: ICD-10-CM

## 2018-02-13 DIAGNOSIS — R35.0: ICD-10-CM

## 2018-02-13 DIAGNOSIS — N83.202: ICD-10-CM
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
## 2018-02-13 DIAGNOSIS — K36: Primary | ICD-10-CM

## 2018-02-13 DIAGNOSIS — Z97.5: ICD-10-CM

## 2018-02-13 DIAGNOSIS — M54.9: ICD-10-CM

## 2018-02-13 LAB
ADD MANUAL DIFF: NO
ANION GAP SERPL CALC-SCNC: 10 MMOL/L (ref 5–19)
APPEARANCE UR: CLEAR
APTT PPP: YELLOW S
BACTERIA (WET MOUNT): (no result)
BASOPHILS # BLD AUTO: 0.1 10^3/UL (ref 0–0.2)
BASOPHILS NFR BLD AUTO: 0.9 % (ref 0–2)
BILIRUB UR QL STRIP: NEGATIVE
BUN SERPL-MCNC: 11 MG/DL (ref 7–20)
CALCIUM: 9.6 MG/DL (ref 8.4–10.2)
CHLAM PCR: NOT DETECTED
CHLORIDE SERPL-SCNC: 109 MMOL/L (ref 98–107)
CO2 SERPL-SCNC: 22 MMOL/L (ref 22–30)
EOSINOPHIL # BLD AUTO: 0.1 10^3/UL (ref 0–0.6)
EOSINOPHIL NFR BLD AUTO: 1.1 % (ref 0–6)
EPITHELIALS (WET MOUNT): (no result)
ERYTHROCYTE [DISTWIDTH] IN BLOOD BY AUTOMATED COUNT: 14.1 % (ref 11.5–14)
GLUCOSE SERPL-MCNC: 88 MG/DL (ref 75–110)
GLUCOSE UR STRIP-MCNC: NEGATIVE MG/DL
GON PCR: NOT DETECTED
HCT VFR BLD CALC: 41.7 % (ref 36–47)
HGB BLD-MCNC: 14.4 G/DL (ref 12–15.5)
KETONES UR STRIP-MCNC: NEGATIVE MG/DL
LYMPHOCYTES # BLD AUTO: 2.2 10^3/UL (ref 0.5–4.7)
LYMPHOCYTES NFR BLD AUTO: 23.1 % (ref 13–45)
MCH RBC QN AUTO: 30.2 PG (ref 27–33.4)
MCHC RBC AUTO-ENTMCNC: 34.6 G/DL (ref 32–36)
MCV RBC AUTO: 87 FL (ref 80–97)
MONOCYTES # BLD AUTO: 0.4 10^3/UL (ref 0.1–1.4)
MONOCYTES NFR BLD AUTO: 4.2 % (ref 3–13)
NEUTROPHILS # BLD AUTO: 6.6 10^3/UL (ref 1.7–8.2)
NEUTS SEG NFR BLD AUTO: 70.7 % (ref 42–78)
NITRITE UR QL STRIP: NEGATIVE
PH UR STRIP: 6 [PH] (ref 5–9)
PLATELET # BLD: 381 10^3/UL (ref 150–450)
POTASSIUM SERPL-SCNC: 4.3 MMOL/L (ref 3.6–5)
PROT UR STRIP-MCNC: NEGATIVE MG/DL
RBC # BLD AUTO: 4.78 10^6/UL (ref 3.72–5.28)
RBCS (WET MOUNT): (no result)
SODIUM SERPL-SCNC: 140.6 MMOL/L (ref 137–145)
SP GR UR STRIP: 1.01
T.VAGINALIS (WET MOUNT): (no result)
TOTAL CELLS COUNTED % (AUTO): 100 %
UROBILINOGEN UR-MCNC: NEGATIVE MG/DL (ref ?–2)
WBC # BLD AUTO: 9.4 10^3/UL (ref 4–10.5)
WBCS (WET MOUNT): (no result)
YEAST (WET MOUNT): (no result)

## 2018-02-13 PROCEDURE — 88304 TISSUE EXAM BY PATHOLOGIST: CPT

## 2018-02-13 PROCEDURE — 0DTJ4ZZ RESECTION OF APPENDIX, PERCUTANEOUS ENDOSCOPIC APPROACH: ICD-10-PCS | Performed by: SURGERY

## 2018-02-13 PROCEDURE — G0378 HOSPITAL OBSERVATION PER HR: HCPCS

## 2018-02-13 PROCEDURE — 87491 CHLMYD TRACH DNA AMP PROBE: CPT

## 2018-02-13 PROCEDURE — 44970 LAPAROSCOPY APPENDECTOMY: CPT

## 2018-02-13 PROCEDURE — 36415 COLL VENOUS BLD VENIPUNCTURE: CPT

## 2018-02-13 PROCEDURE — 81001 URINALYSIS AUTO W/SCOPE: CPT

## 2018-02-13 PROCEDURE — 93976 VASCULAR STUDY: CPT

## 2018-02-13 PROCEDURE — 76830 TRANSVAGINAL US NON-OB: CPT

## 2018-02-13 PROCEDURE — 81025 URINE PREGNANCY TEST: CPT

## 2018-02-13 PROCEDURE — 80048 BASIC METABOLIC PNL TOTAL CA: CPT

## 2018-02-13 PROCEDURE — 85025 COMPLETE CBC W/AUTO DIFF WBC: CPT

## 2018-02-13 PROCEDURE — 87210 SMEAR WET MOUNT SALINE/INK: CPT

## 2018-02-13 PROCEDURE — 99285 EMERGENCY DEPT VISIT HI MDM: CPT

## 2018-02-13 PROCEDURE — 87591 N.GONORRHOEAE DNA AMP PROB: CPT

## 2018-02-13 RX ADMIN — DIPHENHYDRAMINE HYDROCHLORIDE PRN MG: 50 INJECTION INTRAMUSCULAR; INTRAVENOUS at 20:53

## 2018-02-13 RX ADMIN — DIPHENHYDRAMINE HYDROCHLORIDE PRN MG: 50 INJECTION INTRAMUSCULAR; INTRAVENOUS at 20:55

## 2018-02-13 RX ADMIN — MEPERIDINE HYDROCHLORIDE PRN MG: 25 INJECTION INTRAMUSCULAR; INTRAVENOUS; SUBCUTANEOUS at 20:51

## 2018-02-13 RX ADMIN — PROMETHAZINE HYDROCHLORIDE PRN MG: 25 INJECTION INTRAMUSCULAR; INTRAVENOUS at 20:55

## 2018-02-13 RX ADMIN — DIPHENHYDRAMINE HYDROCHLORIDE PRN MG: 50 INJECTION INTRAMUSCULAR; INTRAVENOUS at 20:51

## 2018-02-13 RX ADMIN — MEPERIDINE HYDROCHLORIDE PRN MG: 25 INJECTION INTRAMUSCULAR; INTRAVENOUS; SUBCUTANEOUS at 20:53

## 2018-02-13 RX ADMIN — PROMETHAZINE HYDROCHLORIDE PRN MG: 25 INJECTION INTRAMUSCULAR; INTRAVENOUS at 20:51

## 2018-02-13 RX ADMIN — FENTANYL CITRATE ONE MCG: 50 INJECTION INTRAMUSCULAR; INTRAVENOUS at 19:40

## 2018-02-13 RX ADMIN — DEXAMETHASONE SODIUM PHOSPHATE PRN MG: 10 INJECTION INTRAMUSCULAR; INTRAVENOUS at 21:25

## 2018-02-13 RX ADMIN — FENTANYL CITRATE PRN MCG: 50 INJECTION INTRAMUSCULAR; INTRAVENOUS at 20:55

## 2018-02-13 RX ADMIN — FENTANYL CITRATE PRN MCG: 50 INJECTION INTRAMUSCULAR; INTRAVENOUS at 20:51

## 2018-02-13 RX ADMIN — Medication SCH ML: at 21:25

## 2018-02-13 RX ADMIN — MEPERIDINE HYDROCHLORIDE PRN MG: 25 INJECTION INTRAMUSCULAR; INTRAVENOUS; SUBCUTANEOUS at 20:55

## 2018-02-13 RX ADMIN — OXYCODONE AND ACETAMINOPHEN PRN TAB: 5; 325 TABLET ORAL at 21:25

## 2018-02-13 RX ADMIN — FENTANYL CITRATE ONE MCG: 50 INJECTION INTRAMUSCULAR; INTRAVENOUS at 19:30

## 2018-02-13 RX ADMIN — FENTANYL CITRATE PRN MCG: 50 INJECTION INTRAMUSCULAR; INTRAVENOUS at 20:53

## 2018-02-13 RX ADMIN — PROMETHAZINE HYDROCHLORIDE PRN MG: 25 INJECTION INTRAMUSCULAR; INTRAVENOUS at 20:53

## 2018-02-13 NOTE — PDOC DISCHARGE SUMMARY
General





- Admit/Disc Date/PCP


Admission Date/Primary Care Provider: 


  02/13/18 16:53





  





Discharge Date: 02/14/18





- Discharge Diagnosis


(1) Foreign body accidentally entering other orifice


Is this a current diagnosis for this admission?: Yes   





(2) Foreign body of intestine or colon


Is this a current diagnosis for this admission?: Yes   





- Additional Information


Resuscitation Status: Full Code


Discharge Diet: Regular


Discharge Activity: No Lifting Over 10 Pounds, No Lifting/Push/Pulling, No tub 

bath, Walk Frequently


Prescriptions: 


Docusate Sodium [Colace 100 mg Capsule] 100 mg PO BID #30 capsule


Oxycodone HCl/Acetaminophen [Percocet 5-325 mg Tablet] 1 tab PO Q6HP PRN #30 

tablet


 PRN Reason: 


Home Medications: 








Docusate Sodium [Colace 100 mg Capsule] 100 mg PO BID #30 capsule 02/13/18 


Ibuprofen [Motrin 800 mg Tablet] 800 mg PO TIDP PRN 02/13/18 


Methocarbamol [Robaxin 500 mg Tablet] 500 mg PO QIDP PRN 02/13/18 


Oxycodone HCl/Acetaminophen [Percocet 5-325 mg Tablet] 1 tab PO Q6HP PRN #30 

tablet 02/13/18 











History of Present Illness


History of Present Illness: 


CANDELARIA ROMAN is a 27 year old female with right pelvic pain radiating to 

the back for 2 months now. She had a CT abdomen that revealed a foreign body in 

the appendix tip 4 weeks ago. Yesterday night the pain got worse and she 

presented to the ER. She report no fever, nausea or vomiting. No pathologic 

adnexal masses on US. She says she had accidentally swallowed a tongue piercing 

she had after it came loose. 








Hospital Course


Hospital Course: 





she had a straight-forward laparoscopic appendectomy, is tolerating a diet, no 

nausea or vomiting, pain is better and is discharged.





Physical Exam


Vital Signs: 


 











Temp Pulse Resp BP Pulse Ox


 


 97.8 F   64   18   107/57 L  97 


 


 02/13/18 19:20  02/13/18 20:05  02/13/18 20:05  02/13/18 20:05  02/13/18 20:05








 Intake & Output











 02/12/18 02/13/18 02/14/18





 06:59 06:59 06:59


 


Intake Total   1100


 


Output Total   595


 


Balance   505











General appearance: PRESENT: no acute distress


Head exam: PRESENT: atraumatic


Eye exam: PRESENT: conjunctiva pink


Respiratory exam: PRESENT: clear to auscultation missy.  ABSENT: rales, rhonchi, 

wheezes


Cardiovascular exam: PRESENT: RRR.  ABSENT: diastolic murmur, rubs, systolic 

murmur


GI/Abdominal exam: PRESENT: normal bowel sounds, soft, other - incisions are 

clean, dry, intact.  ABSENT: distended, guarding, mass, organolmegaly, rebound, 

tenderness


Neurological exam: PRESENT: alert, awake, oriented to person, oriented to place

, oriented to time, oriented to situation, CN II-XII grossly intact.  ABSENT: 

motor sensory deficit


Psychiatric exam: PRESENT: appropriate affect, normal mood.  ABSENT: homicidal 

ideation, suicidal ideation





Results


Impressions: 


 





Transvaginal US  02/13/18 13:03


IMPRESSION:  Nothing acute other than 2.2 cm left ovarian cyst.


IUD is noted.


 














Plan


Discharge Plan: 





discharge home


ok to shower in 24 hrs


no tub baths x 3 weeks


no havy lifting > 10 lbs x 4 weeks


F/U in [place] [facility] x 1 week


Time Spent: Greater than 30 Minutes

## 2018-02-13 NOTE — RADIOLOGY REPORT (SQ)
EXAM DESCRIPTION:  U/S NON OB PEL TV W/DOPPLER



COMPLETED DATE/TIME:  2/13/2018 2:55 pm



REASON FOR STUDY:  R lower pelvic pain; eval torsion cyst etc



COMPARISON:  CT abdomen pelvis 1/22/2018.  Some free fluid noted within the cul de sac.

LMP 2/2/2010



TECHNIQUE:  Dynamic and static grayscale images acquired of the pelvis via transvaginal approach and 
recorded on PACS. Additional selected color Doppler and spectral images recorded.



LIMITATIONS:  None.



FINDINGS:  UTERUS: Contour normal.  No mass.

ENDOMETRIAL STRIPE: IUD.

CERVIX: No nabothian cysts.

RIGHT OVARY: No abnormal masses.

RIGHT OVARY DOPPLER: Normal arterial vascular flow without evidence for torsion.

LEFT OVARY: 2.2 cm left ovarian cyst noted.

LEFT OVARY DOPPLER: Normal arterial vascular flow without evidence for torsion.

FREE FLUID: None noted.

OTHER: No other significant finding.

MEASUREMENTS:

UTERUS: 8.5 x 5.9 x 3.6 cm.

ENDOMETRIAL STRIPE: IUD noted.

RIGHT OVARY: 5.0 x 2.0 x 2.6 cm.

LEFT OVARY: 4.0 x 2.9 x 2.9 cm



IMPRESSION:  Nothing acute other than 2.2 cm left ovarian cyst.

IUD is noted.



TECHNICAL DOCUMENTATION:  JOB ID:  [id number]

 2011 [employer]- All Rights Reserved

## 2018-02-13 NOTE — OPERATIVE REPORT
Operative Report


DATE OF SURGERY: 02/13/18


PREOPERATIVE DIAGNOSIS: Appendiceal Foreign body, possibly accidentally 

swallowed tongue piercing, causing pain.


POSTOPERATIVE DIAGNOSIS: Appendiceal Foreign body, possibly accidentally 

swallowed tongue piercing, causing pain.


OPERATION: Laparoscopic Appendectomy


SURGEON: ALEJANDRA Cameron


ANESTHESIA: GA


TISSUE REMOVED OR ALTERED: appendix


COMPLICATIONS: 





none


ESTIMATED BLOOD LOSS: 15 ml


INTRAOPERATIVE FINDINGS: non inflammed appendix with metallic-feeling foreign 

body.


PROCEDURE: 





The patient was brought to the operating room and placed on the operating 

table. General endotracheal anesthesia was administered and she was positioned 

supine with the left arm tucked by her side. The abdomen was prepped with 

chloraprep and sterile drapes laid. A time out was done. Under sterile aseptic 

conditions, a verres nedle was introduced into the peritoneal cavity at the 

Mason's point and pneumoperitoneum was insufflatd to 15mmHg. Access to the 

peritoneal cavity was gained using the optical bladeless trocar technique with 

a 10mm at the umbilicus. Two additional ports were placed placed under vision, 

a 12mm left lower quadrant port and a 5mm midline suprapubic port. The patient 

was positioned in trendelenberg, right side up and the appendix identified at 

the base of the cecum. The mesoappendix was divided with the Ligasure device 

and the appendix transected at its base with an [place] stapler 45mm blue load (

3.5mm open to 1.5mm stapled height). It was retrieved from the peritoneal 

cavity in an endobag. Palpation of the specimen revealed the metallic-feeling 

foreign body within it. There was minor bleeding at the staple line which was 

controlled with clips. Final inspection did not reveal any bleed or enteric 

leaks. The umbilical and 12mm left lower quadrant port sites had their fascial 

layers closed with 0-prolene using the endoclosure device. The pneumoperitoneum 

was desufflated, all the ports were removed. The skin incisions were closed 

with 4-0 monocryl, subcuticular stitches. The wounds were infiltrated with 

local anesthesia, a 1:1 mixture of 1% lidocaine and 0.25% bupivacaine, a total 

of 20cc. They were cleaned and dressed with dermabond. The patient tolerated 

the procedure well, she was extubated in the operating room and taken to the 

PACU in stable condition.

## 2018-02-13 NOTE — PDOC H&P
History of Present Illness


Admission Date/PCP: 


  18 16:53





  





History of Present Illness: 


CANDELARIA ROMAN is a 27 year old female with right pelvic pain radiating to 

the back for 2 months now. She had a CT abdomen that revealed a foreign body in 

the appendix tip 4 weeks ago. Yesterday night the pain got worse and she 

presented to the ER. She report no fever, nausea or vomiting. No pathologic 

adnexal masses on US. She says she had accidentally swallowed a tongue piercing 

she had after it came loose. 








Past Surgical History


Past Surgical History: Reports:  Section





Social History


Smoking Status: Current Some Day Smoker





- Advance Directive


Resuscitation Status: Full Code





Family History


Family History: Arthritis, CAD, DM, Hyperlipidemia, Hypertension.  denies: COPD

, CVA, Malignancy, Thyroid Disfunction


Parental Family History Reviewed: No


Children Family History Reviewed: Unknown


Sibling(s) Family History Reviewed.: Unknown





Medication/Allergy


Home Medications: 








Ibuprofen [Motrin 800 mg Tablet] 800 mg PO TIDP PRN 18 


Methocarbamol [Robaxin 500 mg Tablet] 500 mg PO QIDP PRN 18 








Allergies/Adverse Reactions: 


 





No Known Allergies Allergy (Verified 18 11:59)


 











Review of Systems


Constitutional: ABSENT: chills, fever(s), headache(s), weight gain, weight loss


Eyes: ABSENT: visual disturbances


Ears: ABSENT: hearing changes


Cardiovascular: ABSENT: chest pain, dyspnea on exertion, edema, orthropnea, 

palpitations


Respiratory: ABSENT: cough, hemoptysis


Gastrointestinal: PRESENT: as per HPI


Genitourinary: ABSENT: dysuria, hematuria


Musculoskeletal: PRESENT: back pain


Integumentary: ABSENT: rash, wounds


Neurological: ABSENT: abnormal gait, abnormal speech, confusion, dizziness, 

focal weakness, syncope


Psychiatric: ABSENT: anxiety, depression, homidical ideation, suicidal ideation


Endocrine: ABSENT: cold intolerance, heat intolerance, polydipsia, polyuria


Hematologic/Lymphatic: ABSENT: easy bleeding, easy bruising





Physical Exam


Vital Signs: 


 











Temp Pulse Resp BP Pulse Ox


 


 97.8 F   61   12   110/61   100 


 


 18 19:20  18 19:50  18 19:50  18 19:50  18 19:50








 Intake & Output











 18





 06:59 06:59 06:59


 


Intake Total   1100


 


Output Total   595


 


Balance   505











General appearance: PRESENT: no acute distress, well-developed, well-nourished


Head exam: PRESENT: atraumatic, normocephalic


Eye exam: PRESENT: conjunctiva pink, EOMI, PERRLA.  ABSENT: scleral icterus


Ear exam: PRESENT: normal external ear exam


Neck exam: ABSENT: carotid bruit, JVD, lymphadenopathy, thyromegaly


Respiratory exam: PRESENT: clear to auscultation missy.  ABSENT: rales, rhonchi, 

wheezes


Cardiovascular exam: PRESENT: RRR.  ABSENT: diastolic murmur, rubs, systolic 

murmur


GI/Abdominal exam: PRESENT: normal bowel sounds, soft.  ABSENT: distended, 

guarding, mass, organolmegaly, rebound, tenderness


Extremities exam: PRESENT: full ROM.  ABSENT: calf tenderness, clubbing, pedal 

edema


Neurological exam: PRESENT: alert, awake, oriented to person, oriented to place

, oriented to time, oriented to situation, CN II-XII grossly intact.  ABSENT: 

motor sensory deficit


Psychiatric exam: PRESENT: appropriate affect, normal mood.  ABSENT: homicidal 

ideation, suicidal ideation





Results


Impressions: 


 





Transvaginal US  18 13:03


IMPRESSION:  Nothing acute other than 2.2 cm left ovarian cyst.


IUD is noted.


 














Assessment & Plan





- Diagnosis


(1) Foreign body accidentally entering other orifice


Is this a current diagnosis for this admission?: Yes   





(2) Foreign body of intestine or colon


Qualifiers: 


   Encounter type: initial encounter   Qualified Code(s): T18.3XXA - Foreign 

body in small intestine, initial encounter; T18.4XXA - Foreign body in colon, 

initial encounter; T18.4XXA - Foreign body in colon, initial encounter   


Is this a current diagnosis for this admission?: Yes   





- Plan Summary


Plan Summary: 





The patient will be taken to the OR for an appendectomy for swallowed tongue 

piercing lodged there and presently causing increasing pain. The fear is that 

it may cause appendicitis or perforate the appendix.

## 2018-02-13 NOTE — ER DOCUMENT REPORT
HPI





- HPI


Patient complains to provider of: Pelvic pain


Onset/Duration: Persistent, Worse


Quality of pain: Achy


Pain Level: 5


Context: 





Patient presents with right lower pelvic pain for the past 2 months that 

worsened yesterday.  Patient does report some urinary frequency.  Patient 

denies any vaginal bleeding or discharge.  Patient denies any significant 

change in her appetite.  Patient states she had a CT scan and it showed that 

she had a ruptured ovarian cyst.  Patient has not followed up with a 

gynecologist for further evaluation.


Associated Symptoms: Other - Right pelvic pain.  denies: Fever


Relieved by: Denies


Similar symptoms previously: Yes


Recently seen / treated by doctor: No





- ROS


ROS below otherwise negative: Yes


Systems Reviewed and Negative: Yes All other systems reviewed and negative





- CONSTITUTIONAL


Constitutional: DENIES: Fever, Chills





- GASTROINTESTINAL


Gastrointestinal: REPORTS: Abdominal Pain.  DENIES: Nausea





- URINARY


Urinary: REPORTS: Frequency.  DENIES: Dysuria





- REPRODUCTIVE


Reproductive: DENIES: Pregnant:





- MUSCULOSKELETAL


Musculoskeletal: REPORTS: Back Pain.  DENIES: Extremity pain





- DERM


Skin Color: Normal


Skin Problems: None





Past Medical History





- General


Information source: Patient





- Social History


Smoking Status: Current Some Day Smoker


Frequency of alcohol use: None


Drug Abuse: None


Occupation: Foodservice


Lives with: Family


Family History: Arthritis, CAD, DM, Hyperlipidemia, Hypertension.  denies: COPD

, CVA, Malignancy, Thyroid Disfunction


Patient has suicidal ideation: No


Patient has homicidal ideation: No


Renal/ Medical History: Reports: Hx Ovarian Cysts.  Denies: Hx Peritoneal 

Dialysis


Past Surgical History: Reports: Hx  Section





- Immunizations


Immunizations up to date: Yes


Hx Diphtheria, Pertussis, Tetanus Vaccination: No





Vertical Provider Document





- CONSTITUTIONAL


Agree With Documented VS: Yes


Exam Limitations: No Limitations


General Appearance: WD/WN, No Apparent Distress





- INFECTION CONTROL


TRAVEL OUTSIDE OF THE [place] IN LAST 30 DAYS: No





- HEENT


HEENT: Atraumatic, Normocephalic





- NECK


Neck: Normal Inspection, Supple





- RESPIRATORY


Respiratory: Breath Sounds Normal, No Respiratory Distress


O2 Sat by Pulse Oximetry: 99





- CARDIOVASCULAR


Cardiovascular: Regular Rate, Regular Rhythm, No Murmur





- GI/ABDOMEN


Gastrointestinal: Abdomen Soft, Abdomen Tender - Right lower pelvic tenderness, 

No Organomegaly, Normal Bowel Sounds.  negative: Abdominal Guarding





- REPRODUCTIVE


Female Genitalia: CMT.  negative: Adnexal Pain-Right, Adnexal Pain-Left





- BACK


Back: Abnormal Inspection - Right lower lumbar paraspinal tenderness.  negative

: CVA Tenderness-Right, CVA Tenderness-Left





- MUSCULOSKELETAL/EXTREMETIES


Musculoskeletal/Extremeties: MAEW, FROM





- NEURO


Level of Consciousness: Awake, Alert, Appropriate


Motor/Sensory: No Motor Deficit





- DERM


Integumentary: Warm, Dry, No Rash





Course





- Re-evaluation


Re-evalutation: 





18 14:59


On repeat abdominal exam.  Patient with tenderness only to right lower inguinal/

pelvic area, no McBurney point tenderness.  Patient continues to guard area and 

walk with a stooped gait


18 16:08


Consult with Dr. Rene regarding patient's continued right lower pelvic 

tenderness and right lower back pain.  Reviewed patient's previous CT scan 

performed at the end of last month which showed a metallic foreign body in her 

appendix.  Spoke with patient, patient states that she did swallow the back of 

her lip piercing.  Consulted with surgeon who agrees to come and evaluate 

patient.


18 16:17


Dr. Cameron valuated patient and plans to take her to the operating room.





- Vital Signs


Vital signs: 


 











Temp Pulse Resp BP Pulse Ox


 


 98.6 F   87   18   167/90 H  99 


 


 18 12:34  18 12:34  18 12:34  18 12:34  18 12:34














- Laboratory


Result Diagrams: 


 18 16:30





 18 16:30





- Diagnostic Test


Radiology reviewed: Reports reviewed - Reviewed report from today as well as CT 

report from .





Discharge





- Discharge


Clinical Impression: 


 Pelvic pain, foreign body in appendix





Condition: Good


Disposition: ADMITTED AS INPATIENT


Admitting Provider: Surgicalist


Unit Admitted: Medical Floor

## 2018-02-13 NOTE — ER DOCUMENT REPORT
ED Medical Screen (RME)





- General


Chief Complaint: Pelvic Pain


Stated Complaint: BACK PAIN


Time Seen by Provider: 18 12:58


Notes: 





RME DISCLOSURE


I have seen this patient as part of a Rapid Medical Evaluation and, if 

applicable, placed any initially appropriate orders. The patient will be seen 

and fully evaluated, including a full history and physical exam, by a provider (

in Main ED or Fast Track) when a room becomes available.





27-year-old female here with complaints of pelvic pain and back pain as well as 

urinary frequency ongoing for the past few days.  She reports that she has had 

this once before several weeks ago and a CT scan showed an ovarian cyst however 

she did not have an ultrasound performed.  She denies any vaginal bleeding or 

discharge or dysuria or fevers or chills.  She does have some nausea but no 

vomiting or diarrhea.  She has tried taking muscle relaxers for the symptoms.  

Her last period was .





EXAM


Mild tenderness palpation in the right lateral suprapubic region


No right lower quadrant tenderness


No peritoneal signs


TRAVEL OUTSIDE OF THE [place] IN LAST 30 DAYS: No





- Related Data


Allergies/Adverse Reactions: 


 





No Known Allergies Allergy (Verified 18 11:59)


 











Past Medical History





- Social History


Frequency of alcohol use: None


Drug Abuse: None


Renal/ Medical History: Reports: Hx Ovarian Cysts.  Denies: Hx Peritoneal 

Dialysis


Past Surgical History: Reports: Hx  Section





- Immunizations


Immunizations up to date: Yes


Hx Diphtheria, Pertussis, Tetanus Vaccination: No





Physical Exam





- Vital signs


Vitals: 





 











Temp Pulse Resp BP Pulse Ox


 


 98.6 F   87   18   167/90 H  99 


 


 18 12:34  18 12:34  18 12:34  18 12:34  18 12:34














Course





- Vital Signs


Vital signs: 





 











Temp Pulse Resp BP Pulse Ox


 


 98.6 F   87   18   167/90 H  99 


 


 18 12:34  18 12:34  18 12:34  18 12:34  18 12:34

## 2018-02-14 VITALS — SYSTOLIC BLOOD PRESSURE: 106 MMHG | DIASTOLIC BLOOD PRESSURE: 67 MMHG

## 2018-02-14 RX ADMIN — DEXAMETHASONE SODIUM PHOSPHATE PRN MG: 10 INJECTION INTRAMUSCULAR; INTRAVENOUS at 06:51

## 2018-02-14 RX ADMIN — OXYCODONE AND ACETAMINOPHEN PRN TAB: 5; 325 TABLET ORAL at 06:51

## 2018-02-14 RX ADMIN — Medication SCH ML: at 06:27

## 2019-07-21 ENCOUNTER — HOSPITAL ENCOUNTER (EMERGENCY)
Dept: HOSPITAL 62 - ER | Age: 29
Discharge: HOME | End: 2019-07-21
Payer: SELF-PAY

## 2019-07-21 VITALS — SYSTOLIC BLOOD PRESSURE: 145 MMHG | DIASTOLIC BLOOD PRESSURE: 78 MMHG

## 2019-07-21 DIAGNOSIS — F17.200: ICD-10-CM

## 2019-07-21 DIAGNOSIS — M54.5: ICD-10-CM

## 2019-07-21 DIAGNOSIS — R10.2: ICD-10-CM

## 2019-07-21 DIAGNOSIS — N83.201: Primary | ICD-10-CM

## 2019-07-21 DIAGNOSIS — N83.202: ICD-10-CM

## 2019-07-21 LAB
APPEARANCE UR: (no result)
APTT PPP: YELLOW S
BILIRUB UR QL STRIP: NEGATIVE
CHLAM PCR: NOT DETECTED
GLUCOSE UR STRIP-MCNC: NEGATIVE MG/DL
KETONES UR STRIP-MCNC: NEGATIVE MG/DL
NITRITE UR QL STRIP: NEGATIVE
PH UR STRIP: 8 [PH] (ref 5–9)
PROT UR STRIP-MCNC: NEGATIVE MG/DL
SP GR UR STRIP: 1.01
T.VAGINALIS (WET MOUNT): (no result)
UROBILINOGEN UR-MCNC: NEGATIVE MG/DL (ref ?–2)
WBCS (WET MOUNT): (no result)
YEAST (WET MOUNT): (no result)

## 2019-07-21 PROCEDURE — 76830 TRANSVAGINAL US NON-OB: CPT

## 2019-07-21 PROCEDURE — 81001 URINALYSIS AUTO W/SCOPE: CPT

## 2019-07-21 PROCEDURE — 87591 N.GONORRHOEAE DNA AMP PROB: CPT

## 2019-07-21 PROCEDURE — 81025 URINE PREGNANCY TEST: CPT

## 2019-07-21 PROCEDURE — 87210 SMEAR WET MOUNT SALINE/INK: CPT

## 2019-07-21 PROCEDURE — 87491 CHLMYD TRACH DNA AMP PROBE: CPT

## 2019-07-21 PROCEDURE — 93976 VASCULAR STUDY: CPT

## 2019-07-21 PROCEDURE — 99284 EMERGENCY DEPT VISIT MOD MDM: CPT

## 2019-07-21 NOTE — RADIOLOGY REPORT (SQ)
EXAM DESCRIPTION:  U/S NON OB PEL TV W/DOPPLER



COMPLETED DATE/TIME:  7/21/2019 3:47 pm



REASON FOR STUDY:  ?IUD dislodged/ vaginal discharge



COMPARISON:  2/13/2018



TECHNIQUE:  Dynamic and static grayscale images acquired of the pelvis via transvaginal approach and 
recorded on PACS. Additional selected color Doppler and spectral images recorded.



LIMITATIONS:  None.



FINDINGS:  UTERUS: Contour normal.  No mass.

ENDOMETRIAL STRIPE: No focal or generalized thickening. No masses.  IUD is present in the endometrial
 cavity.  Small volume fluid in the endometrial cavity.

CERVIX: No nabothian cysts.  Small volume endocervical fluid.

RIGHT OVARY AND DOPPLER: Normal size. No worrisome masses.  Simple cyst measuring 1.9 cm.  Normal art
erial vascular flow without evidence for torsion.

LEFT OVARY AND DOPPLER: Normal size. No worrisome masses.  Simple cyst measuring 1.2 cm.  Normal janice
rial vascular flow without evidence for torsion.

FREE FLUID: None noted.

OTHER: No other significant finding.

MEASUREMENTS:

UTERUS: 2.8 x 4.2 x 5.8 cm.

ENDOMETRIAL STRIPE: 5 mm.

RIGHT OVARY: 4.3 x 2.3 x 3.8 cm.

LEFT OVARY: 3.0 x 2.9 x 3.0 cm.



IMPRESSION:  1.  IUD is present in the endometrial cavity.  Small volume of nonspecific endometrial a
nd endocervical fluid.

2.  Small bilateral ovarian cysts.



TECHNICAL DOCUMENTATION:  JOB ID:  [id number]

 2011 [employer]- All Rights Reserved                          Rev-5/18



Reading location - IP/workstation name: [email or web address]

## 2019-07-21 NOTE — ER DOCUMENT REPORT
HPI





- HPI


Patient complains to provider of: pelvic pain


Time Seen by Provider: 19 14:10


Pain Level: 3





- REPRODUCTIVE


Reproductive: DENIES: Pregnant:





- DERM


Skin Color: Normal





Past Medical History





- Social History


Smoking Status: Current Every Day Smoker


Chew tobacco use (# tins/day): No


Frequency of alcohol use: Occasional


Drug Abuse: None


Family History: Arthritis, CAD, DM, Hyperlipidemia, Hypertension.  denies: COPD,

CVA, Malignancy, Thyroid Disfunction


Patient has suicidal ideation: No


Patient has homicidal ideation: No


Renal/ Medical History: Reports: Hx Ovarian Cysts.  Denies: Hx Peritoneal 

Dialysis


Past Surgical History: Reports: Hx Appendectomy, Hx  Section





- Immunizations


Immunizations up to date: Yes


Hx Diphtheria, Pertussis, Tetanus Vaccination: No





Vertical Provider Document





- INFECTION CONTROL


TRAVEL OUTSIDE OF THE [place] IN LAST 30 DAYS: No





Course





- Vital Signs


Vital signs: 


                                        











Temp Pulse Resp BP Pulse Ox


 


 98.6 F   82   16   147/79 H  99 


 


 19 13:58  19 13:58  19 13:58  19 13:58  19 13:58














Discharge





- Discharge


Clinical Impression: 


 Low back pain, Pelvic pain





Ovarian cyst


Qualifiers:


 Laterality: bilateral Qualified Code(s): N83.201 - Unspecified ovarian cyst, 

right side; N83.202 - Unspecified ovarian cyst, left side





Condition: Good


Disposition: HOME, SELF-CARE


Instructions:  Ovarian Cyst ([facility])


Additional Instructions: 


Follow-up with PCP/obgyn 1 to 2 days.  Return for any worsening symptoms. 

tylenol or motrin for any pain or fever. drink plenty of fluids. warm compresses

to the area 


Forms:  Return to Work

## 2019-07-21 NOTE — ER DOCUMENT REPORT
ED Medical Screen (RME)





- General


Chief Complaint: Lower Abdominal Pain


Stated Complaint: BACK PAIN


Time Seen by Provider: 19 14:10


Notes: 





Healthy 29-year-old sexually active female with history of ovarian cysts 

presents the department with chief complaint of right-sided lower back pain and 

concerned that her IUD is dislodged.  Patient states the back pain is been 

present since Friday and it happened at work while she was not doing anything.  

Patient is unclear if it was related to intercourse or if it is musculoskeletal 

in nature.  Patient is also concerned that her IUD came dislodged it is unclear 

if it happened spontaneously but she did notice that shortly after having sex.  

She is in a monogamous relationship at this time but is still unclear if she has

been exposed to STIs.  Patient denies any fevers or chills, nausea or vomiting, 

denies any urinary frequency/dysuria/urgency/hematuria, states she is having 

vaginal discharge but believes it is normal, not foul-smelling.  No other 

complaints.


TRAVEL OUTSIDE OF THE [place] IN LAST 30 DAYS: No





- Related Data


Allergies/Adverse Reactions: 


                                        





No Known Allergies Allergy (Verified 19 13:54)


   











Past Medical History





- Social History


Chew tobacco use (# tins/day): No


Frequency of alcohol use: Occasional


Drug Abuse: None


Renal/ Medical History: Reports: Hx Ovarian Cysts.  Denies: Hx Peritoneal 

Dialysis


Past Surgical History: Reports: Hx Appendectomy, Hx  Section





- Immunizations


Immunizations up to date: Yes


Hx Diphtheria, Pertussis, Tetanus Vaccination: No


History of Influenza Vaccine for 10/2017 - 3/2018 Season: Refused





Physical Exam





- Vital signs


Vitals: 





                                        











Temp Pulse Resp BP Pulse Ox


 


 98.6 F   82   16   147/79 H  99 


 


 19 13:58  19 13:58  19 13:58  19 13:58  19 13:58














- Notes


Notes: 





PHYSICAL EXAMINATION:





Reviewed vital signs and charting by RN





GENERAL: Alert, interacts well. No acute distress.


HEAD: Normocephalic, atraumatic.


EYES: Pupils equal and round. Extraocular movements intact.


ENT: Oral mucosa moist, tongue midline. 


NECK: Full range of motion. Trachea midline.


LUNGS: Clear to auscultation bilaterally, no wheezes, rales, or rhonchi. No 

respiratory distress.


HEART: Regular rate and rhythm. No murmur


ABDOMEN: Deferred triage


EXTREMITIES: Moves all 4 extremities spontaneously. No edema,  No cyanosis.


PSYCH: Normal affect, normal mood.


SKIN: Warm, dry, normal turgor. No rashes or lesions noted.








Course





- Vital Signs


Vital signs: 





                                        











Temp Pulse Resp BP Pulse Ox


 


 98.6 F   82   16   147/79 H  99 


 


 19 13:58  19 13:58  19 13:58  19 13:58  19 13:58